# Patient Record
Sex: MALE | Race: WHITE | Employment: FULL TIME | URBAN - METROPOLITAN AREA
[De-identification: names, ages, dates, MRNs, and addresses within clinical notes are randomized per-mention and may not be internally consistent; named-entity substitution may affect disease eponyms.]

---

## 2017-01-17 DIAGNOSIS — K20.0 EOSINOPHILIC ESOPHAGITIS: Primary | ICD-10-CM

## 2017-01-25 ENCOUNTER — HOSPITAL ENCOUNTER (OUTPATIENT)
Facility: CLINIC | Age: 32
End: 2017-01-25
Attending: INTERNAL MEDICINE | Admitting: INTERNAL MEDICINE
Payer: COMMERCIAL

## 2017-02-07 DIAGNOSIS — K20.0 EOSINOPHILIC ESOPHAGITIS: Primary | ICD-10-CM

## 2017-03-01 DIAGNOSIS — K20.0 EOSINOPHILIC ESOPHAGITIS: ICD-10-CM

## 2017-03-02 RX ORDER — MECOBALAMIN 5000 MCG
15 TABLET,DISINTEGRATING ORAL 2 TIMES DAILY
Qty: 90 CAPSULE | Refills: 1 | Status: SHIPPED | OUTPATIENT
Start: 2017-03-02

## 2017-04-06 DIAGNOSIS — K20.0 EOSINOPHILIC ESOPHAGITIS: ICD-10-CM

## 2017-04-11 ENCOUNTER — OFFICE VISIT (OUTPATIENT)
Dept: FAMILY MEDICINE | Facility: CLINIC | Age: 32
End: 2017-04-11
Payer: COMMERCIAL

## 2017-04-11 VITALS
BODY MASS INDEX: 26.2 KG/M2 | SYSTOLIC BLOOD PRESSURE: 130 MMHG | HEART RATE: 101 BPM | HEIGHT: 70 IN | DIASTOLIC BLOOD PRESSURE: 82 MMHG | WEIGHT: 183 LBS | TEMPERATURE: 98 F | OXYGEN SATURATION: 99 %

## 2017-04-11 DIAGNOSIS — K20.0 EOSINOPHILIC ESOPHAGITIS: ICD-10-CM

## 2017-04-11 DIAGNOSIS — Z01.818 PREOP GENERAL PHYSICAL EXAM: Primary | ICD-10-CM

## 2017-04-11 PROCEDURE — 99214 OFFICE O/P EST MOD 30 MIN: CPT | Performed by: PREVENTIVE MEDICINE

## 2017-04-11 ASSESSMENT — PAIN SCALES - GENERAL: PAINLEVEL: NO PAIN (0)

## 2017-04-11 NOTE — MR AVS SNAPSHOT
After Visit Summary   4/11/2017    Jayce Ortiz    MRN: [id number]           Patient Information     Date Of Birth          1985        Visit Information        Provider Department      4/11/2017 7:00 AM Eugenia Chavez MD [facility]        Today's Diagnoses     Preop general physical exam    -  1    Eosinophilic esophagitis          Care Instructions    At [facility], we strive to deliver an exceptional experience to you, every time we see you.    If you receive a survey in the mail, please send us back your thoughts. We really do value your feedback.    Thank you for visiting [facility]    Normal or non-critical lab and imaging results will be communicated to you by MyChart, letter or phone within 7 days.  If you do not hear from us within 10 days, please call the clinic. If you have a critical or abnormal lab result, we will notify you by phone as soon as possible.     If you have any questions regarding your visit please contact:     Team Amanda/Spirit  Clinic Hours Telephone Number   Dr. Pascual Foster   7am-7pm  Monday through Thursday  7am-5pm Friday [phone]  Brianna TELLEZ RN   Pharmacy 8:30am-9pm Monday-Friday    9am-5pm Saturday-Sunday [phone]   Urgent Care 11am-9pm Monday-Friday        9am-5pm Saturday-Sunday [phone]     After hours, weekend or if you need to make an appointment with your primary provider please call [phone].   After Hours nurse advise: call [facility]: [phone]    Use Altacorhart (secure email communication and access to your chart) to send your primary care provider a message or make an appointment. Ask someone on your Team how to sign up for CITYBIZLIST. To log on to Monsoon Commerce or for more information in BalaBit please visit the website at [email or web address].   As  of October 8, 2013, all password changes, disabled accounts, or ID changes in Samanaget/MyHealth will be done by our Access Services Department.   If you need help with your account or password, call: [phone]. Clinic staff no longer has the ability to change passwords.     Before Your Surgery      Call your surgeon if there is any change in your health. This includes signs of a cold or flu (such as a sore throat, runny nose, cough, rash or fever).    Do not smoke, drink alcohol or take over the counter medicine (unless your surgeon or primary care doctor tells you to) for the 24 hours before and after surgery.    If you take prescribed drugs: Follow your doctor s orders about which medicines to take and which to stop until after surgery.    Eating and drinking prior to surgery: follow the instructions from your surgeon    Take a shower or bath the night before surgery. Use the soap your surgeon gave you to gently clean your skin. If you do not have soap from your surgeon, use your regular soap. Do not shave or scrub the surgery site.  Wear clean pajamas and have clean sheets on your bed.         Follow-ups after your visit        Your next 10 appointments already scheduled     Apr 24, 2017  6:00 PM CDT   New Visit with Armani Frost OD   [facility]    [address] [place] [id number] [phone]            Apr 27, 2017   Procedure with Julius Augustine MD   [facility] [place] ([facility], [facility])    [address] [address] [phone]           The [facility] is located on the corner of [address] and [address] on the [address] [facility]. It is easily accessible from virtually any point [place] the [place], via [address].            May 02, 2017  2:00 PM CDT   (Arrive by 1:45 PM)   New Patient Visit with James Coronado PA-C  "  [facility] ([facility])    [address]  [address]  [place] [address] [phone]              Who to contact     If you have questions or need follow up information about today's clinic visit or your schedule please contact [facility] directly at [phone].  Normal or non-critical lab and imaging results will be communicated to you by MyChart, letter or phone within 4 business days after the clinic has received the results. If you do not hear from us within 7 days, please contact the clinic through Cartup Commercehart or phone. If you have a critical or abnormal lab result, we will notify you by phone as soon as possible.  Submit refill requests through [email or web address] or call your pharmacy and they will forward the refill request to us. Please allow 3 business days for your refill to be completed.          Additional Information About Your Visit        [employer]up Commerce[employer] Information     [email or web address] gives you secure access to your electronic health record. If you see a primary care provider, you can also send messages to your care team and make appointments. If you have questions, please call your primary care clinic.  If you do not have a primary care provider, please call [phone] and they will assist you.        Care EveryWhere ID     This is your Care EveryWhere ID. This could be used by other organizations to access your [facility] medical records  [id number]        Your Vitals Were     Pulse Temperature Height Pulse Oximetry BMI (Body Mass Index)       101 98  F (36.7  C) (Oral) 5' 10\" (1.778 m) 99% 26.26 kg/m2        Blood Pressure from Last 3 Encounters:   04/11/17 130/82   12/12/16 (!) 133/97   10/17/16 (!) 146/98    Weight from Last 3 Encounters:   04/11/17 183 lb (83 kg)   12/12/16 180 lb (81.6 kg)   10/17/16 180 lb (81.6 kg)              Today, you had the following     No orders found for display       Primary Care Provider Office Phone # Fax # "    Eugenia Chavez [phone]       [facility] [address]  [facility] [address]        Thank you!     Thank you for choosing [facility]  for your care. Our goal is always to provide you with excellent care. Hearing back from our patients is one way we can continue to improve our services. Please take a few minutes to complete the written survey that you may receive in the mail after your visit with us. Thank you!             Your Updated Medication List - Protect others around you: Learn how to safely use, store and throw away your medicines at www.disposemymeds.org.          This list is accurate as of: 4/11/17  7:30 AM.  Always use your most recent med list.                   Brand Name Dispense Instructions for use    budesonide 0.6 mg/mL Susp    ENTOCORT    300 mL    Take 1.67 mLs (1 mg) by mouth 2 times daily       LANsoprazole 15 MG CR capsule    PREVACID    90 capsule    Take 1 capsule (15 mg) by mouth 2 times daily Take 30-60 minutes before a meal.       loratadine 10 MG tablet    CLARITIN     1 tablet daily as needed.       multivitamin, therapeutic Tabs tablet      Take 1 tablet by mouth daily

## 2017-04-11 NOTE — PATIENT INSTRUCTIONS
At [facility], we strive to deliver an exceptional experience to you, every time we see you.    If you receive a survey in the mail, please send us back your thoughts. We really do value your feedback.    Thank you for visiting [facility]    Normal or non-critical lab and imaging results will be communicated to you by MyChart, letter or phone within 7 days.  If you do not hear from us within 10 days, please call the clinic. If you have a critical or abnormal lab result, we will notify you by phone as soon as possible.     If you have any questions regarding your visit please contact:     Team Amanda/Spirit  Clinic Hours Telephone Number   Dr. Pascual Foster   7am-7pm  Monday through Thursday  7am-5pm Friday [phone]  Brianna TELLEZ RN   Pharmacy 8:30am-9pm Monday-Friday    9am-5pm Saturday-Sunday [phone]   Urgent Care 11am-9pm Monday-Friday        9am-5pm Saturday-Sunday [phone]     After hours, weekend or if you need to make an appointment with your primary provider please call [phone].   After Hours nurse advise: call [facility]: [phone]    Use UXCamhart (secure email communication and access to your chart) to send your primary care provider a message or make an appointment. Ask someone on your Team how to sign up for D[employer]. To log on to Akademos or for more information in Bagels and Bean please visit the website at [email or web address].   As of October 8, 2013, all password changes, disabled accounts, or ID changes in DKT Technology/MyHealth will be done by our Access Services Department.   If you need help with your account or password, call: [phone]. Clinic staff no longer has the ability to change passwords.     Before Your Surgery      Call your surgeon if there is any change in your health. This includes signs of a cold or flu (such  as a sore throat, runny nose, cough, rash or fever).    Do not smoke, drink alcohol or take over the counter medicine (unless your surgeon or primary care doctor tells you to) for the 24 hours before and after surgery.    If you take prescribed drugs: Follow your doctor s orders about which medicines to take and which to stop until after surgery.    Eating and drinking prior to surgery: follow the instructions from your surgeon    Take a shower or bath the night before surgery. Use the soap your surgeon gave you to gently clean your skin. If you do not have soap from your surgeon, use your regular soap. Do not shave or scrub the surgery site.  Wear clean pajamas and have clean sheets on your bed.

## 2017-04-11 NOTE — PROGRESS NOTES
[address] [address]  [phone]  Dept: [phone]    PRE-OP EVALUATION:  Today's date: 2017    Jayce Ortiz (: 1985) presents for pre-operative evaluation assessment as requested by Dr. Abraham.  He requires evaluation and anesthesia risk assessment prior to undergoing surgery/procedure for treatment of dysphagia and eosinophilic esophagitis.  Proposed procedure: EGD, Anesthesia: MAC    Date of Surgery/ Procedure: 17  Time of Surgery/ Procedure: 10:00 am  Hospital/Surgical Facility: [facility]  Fax number for surgical facility: [phone]  Primary Physician: Eugenia Chavez  Type of Anesthesia Anticipated: General    Patient has a Health Care Directive or Living Will:  NO    1. NO - Do you have a history of heart attack, stroke, stent, bypass or surgery on an artery in the head, neck, heart or legs?  2. NO - Do you ever have any pain or discomfort in your chest?  3. NO - Do you have a history of  Heart Failure?  4. NO - Are you troubled by shortness of breath when: walking on the level, up a slight hill or at night?  5. NO - Do you currently have a cold, bronchitis or other respiratory infection?  6. NO - Do you have a cough, shortness of breath or wheezing?  7. NO - Do you sometimes get pains in the calves of your legs when you walk?  8. YES - Do you or anyone in your family have previous history of blood clots? Mother with PE 2016.   9. NO - Do you or does anyone in your family have a serious bleeding problem such as prolonged bleeding following surgeries or cuts?  10. NO - Have you ever had problems with anemia or been told to take iron pills?  11. NO - Have you had any abnormal blood loss such as black, tarry or bloody stools, or abnormal vaginal bleeding?  12. NO - Have you ever had a blood transfusion?  13. NO - Have you or any of your relatives ever had problems with anesthesia?  14. YES - Do you have sleep apnea,  excessive snoring or daytime drowsiness? Snoring+, no past history of sleep apnea.    15. NO - Do you have any prosthetic heart valves?  16. NO - Do you have prosthetic joints?  17. NO - Is there any chance that you may be pregnant?      HPI:                                                      Brief HPI related to upcoming procedure: History of progressive dysphagia over the last 2 years. He was evaluated at the [facility] with an upper endoscopy by Dr. Rowell, which showed narrowing of the esophagus, mucosal changes and biopsies consistent with eosinophilic esophagitis.      See problem list for active medical problems.  Problems all longstanding and stable, except as noted/documented.  See ROS for pertinent symptoms related to these conditions.                                                                                                  .    MEDICAL HISTORY:                                                      Patient Active Problem List    Diagnosis Date Noted     Eosinophilic esophagitis 09/29/2016     Priority: Medium     Esophageal pain 09/29/2016     Priority: Medium     CARDIOVASCULAR SCREENING; LDL GOAL LESS THAN 160 06/08/2012     Family history of premature coronary artery disease 06/08/2012     Father with MI at the age of 35 years.        Other acne 08/05/2003      Past Medical History:   Diagnosis Date     Allergic rhinitis, cause unspecified      Elevated lipoprotein A level 6/14/12    On nonfasting specimen     Headache(784.0)      Other acne      Past Surgical History:   Procedure Laterality Date     COMBINED ESOPHAGOSCOPY, GASTROSCOPY, DUODENOSCOPY (EGD) WITH CO2 INSUFFLATION N/A 9/29/2016    Procedure: COMBINED ESOPHAGOSCOPY, GASTROSCOPY, DUODENOSCOPY (EGD) WITH CO2 INSUFFLATION;  Surgeon: Ruperto Rowell MD;  Location:  OR     ESOPHAGOSCOPY, GASTROSCOPY, DUODENOSCOPY (EGD), COMBINED N/A 9/29/2016    Procedure: COMBINED ESOPHAGOSCOPY, GASTROSCOPY, DUODENOSCOPY (EGD),  "BIOPSY SINGLE OR MULTIPLE;  Surgeon: Ruperto Rowell MD;  Location: [facility] OR     ESOPHAGOSCOPY, GASTROSCOPY, DUODENOSCOPY (EGD), COMBINED N/A 12/12/2016    Procedure: COMBINED ESOPHAGOSCOPY, GASTROSCOPY, DUODENOSCOPY (EGD);  Surgeon: Julius Monae MD;  Location: [facility]     NO HISTORY OF SURGERY       Current Outpatient Prescriptions   Medication Sig Dispense Refill     budesonide (ENTOCORT) 0.6 mg/mL SUSP Take 1.67 mLs (1 mg) by mouth 2 times daily 300 mL 0     LANsoprazole (PREVACID) 15 MG CR capsule Take 1 capsule (15 mg) by mouth 2 times daily Take 30-60 minutes before a meal. 90 capsule 1     multivitamin, therapeutic (THERA-VIT) TABS tablet Take 1 tablet by mouth daily       loratadine (CLARITIN) 10 MG tablet 1 tablet daily as needed.       OTC products: None, except as noted above    Allergies   Allergen Reactions     No Known Allergies       Latex Allergy: NO    Social History   Substance Use Topics     Smoking status: Never Smoker     Smokeless tobacco: Never Used      Comment: no smoke exposure at home     Alcohol use Yes      Comment: occasionally     History   Drug Use No       REVIEW OF SYSTEMS:                                                    Constitutional, neuro, ENT, endocrine, pulmonary, cardiac, gastrointestinal, genitourinary, musculoskeletal, integument and psychiatric systems are negative, except as otherwise noted.    EXAM:                                                    BP (!) 147/91  Pulse 101  Temp 98  F (36.7  C) (Oral)  Ht 5' 10\" (1.778 m)  Wt 183 lb (83 kg)  SpO2 99%  BMI 26.26 kg/m2    GENERAL APPEARANCE: healthy, alert and no distress     EYES: EOMI,  PERRL     NECK: no adenopathy, no asymmetry, masses, or scars and thyroid normal to palpation     RESP: lungs clear to auscultation - no rales, rhonchi or wheezes     CV: regular rates and rhythm, normal S1 S2, no S3 or S4 and no murmur, click or rub     ABDOMEN:  soft, nontender, no HSM or masses      MS: " extremities normal- no gross deformities noted, no evidence of inflammation in joints, FROM in all extremities.     SKIN: no suspicious lesions or rashes     NEURO: Normal strength and tone, sensory exam grossly normal, mentation intact and speech normal     PSYCH: mentation appears normal. and affect normal/bright     LYMPHATICS: No axillary, cervical, or supraclavicular nodes    DIAGNOSTICS:                                                      EKG: Not indicated due to non-vascular surgery and low risk of event (age <65 and without cardiac risk factors)  Labs Resulted Today:   Results for orders placed or performed during the hospital encounter of 12/12/16   UPPER GI ENDOSCOPY   Result Value Ref Range    Upper GI Endoscopy       [address] [place] [address] [phone]     Endoscopy Department  _______________________________________________________________________________  Patient Name: Jayce Ortiz       Procedure Date: 12/12/2016 10:08 AM  MRN: [id number]                       Account Number: [id number]  YOB: 1985             Admit Type: Outpatient  Age: 31                                Gender: Male  Note Status: Finalized                Attending MD: Julius Sanon MD  _______________________________________________________________________________     Procedure:           Upper GI endoscopy  Indications:         Dysphagia, Follow-up of eosinophilic esophagitis  Providers:           Julius Sanon MD, Can Hall, RN  Referring MD:        Chris Wesley  Medicines:           Midazolam 5 mg IV, Fentanyl 200 micrograms IV,                        Diphenhydramine 25 mg IV  Complications:       No immediate complications.  ________________ _______________________________________________________________  Procedure:           Pre-Anesthesia Assessment:                       - Prior to the procedure, a History and Physical was                         performed, and patient medications and allergies were                        reviewed. The patient is competent. The risks and                        benefits of the procedure and the sedation options and                        risks were discussed with the patient. All questions                        were answered and informed consent was obtained. Patient                        identification and proposed procedure were verified by                        the physician in the pre-procedure area. Mental Status                        Examination: alert and oriented. Airway Examination:                        normal oropharyngeal airway and neck mobility and                        Mallampati Class II (the uvula but not tonsillar pillars                        visualized). Respira tory Examination: clear to                        auscultation. CV Examination: normal. Prophylactic                        Antibiotics: The patient does not require prophylactic                        antibiotics. Prior Anticoagulants: The patient has taken                        no previous anticoagulant or antiplatelet agents. ASA                        Grade Assessment: II - A patient with mild systemic                        disease. After reviewing the risks and benefits, the                        patient was deemed in satisfactory condition to undergo                        the procedure. The anesthesia plan was to use moderate                        sedation / analgesia (conscious sedation). Immediately                        prior to administration of medications, the patient was                        re-assessed for adequacy to receive sedatives. The heart                        rate, respiratory rate, oxygen saturations, blood                        pressure, adequacy of p ulmonary ventilation, and                        response to care were monitored throughout the                        procedure. The physical status of  the patient was                        re-assessed after the procedure.                       After obtaining informed consent, the endoscope was                        passed under direct vision. Throughout the procedure,                        the patient's blood pressure, pulse, and oxygen                        saturations were monitored continuously. The Endoscope                        was introduced through the mouth, and advanced to the                        second part of duodenum. The upper GI endoscopy was                        accomplished without difficulty. The patient tolerated                        the procedure well.                                                                                   Findings:       Mucosal changes including ringed esophagus were found in the upper third        of the esophagus and in  the middle third of the esophagus, inner        diameter 10 mm. A guidewire was placed and the scope was withdrawn.        Dilation was performed with a Savary dilator with moderate resistance at        12 mm. Biopsies were taken with a cold forceps for histology in distal        and midesophagus.       The entire examined stomach was normal.       The duodenal bulb, first part of the duodenum and 2nd part of the        duodenum were normal.                                                                                   Impression:          - Esophageal mucosal changes consistent with                        eosinophilic esophagitis. Dilated with Savary dilator to                        12 mm. Biopsied.                       - Normal stomach.                       - Normal duodenal bulb, first part of the duodenum and                        2nd part of the duodenum.  Recommendation:      - Await pathology results.                       - The findings and recommendations were discussed  with                        the patient and their spouse.                       - Return to  "referring physician.                                                                                     _____________________  Julius Sanon MD  12/12/2016 11:02 AM  Number of Addenda: 0    Note Initiated On: 12/12/2016 10:08 AM     Surgical pathology exam   Result Value Ref Range    Copath Report       Patient Name: ERICK GIANG  MR#: [id number]  Specimen #: [id number]  Collected: 12/12/2016  Received: 12/12/2016  Reported: 12/13/2016 09:26  Ordering Phy(s): JULIUS MARMOLEJO    For improved result formatting, select 'View Enhanced Report Format'  under Linked Documents section.    SPECIMEN(S):  A: Esophageal biopsy, distal  B: Esophageal biopsy, mid    FINAL DIAGNOSIS:  A. DISTAL ESOPHAGUS, BIOPSY:  - Esophageal squamous mucosa with focal increased numbers of eosinophils  (up to 80/hpf) compatible with eosinophilic esophagitis  - No evidence of intestinal metaplasia or dysplasia  -Predominantly unremarkable squamous mucosa (eosinophilia is found on  only one of the multiple pieces)    B. MID ESOPHAGUS, BIOPSY:  - Esophageal squamous mucosa with increased numbers of eosinophils (up  to 40/hpf) compatible with eosinophilic esophagitis  - No evidence of intestinal metaplasia or dysplasia    I have personally reviewed all specimens and or slides, including the  listed special s tains, and used them with my medical judgement to  determine the final diagnosis.    Electronically signed out by:    Kevin Zamora M.D    CLINICAL HISTORY:    Dysphagia, eosinophilic esophagitis    GROSS:  A:  The specimen is received in formalin with proper patient  identification, labeled \"distal esophagus biopsies\".  The specimen  consists of four white tan soft tissue fragments ranging in size from  0.2-0.4 cm in greatest dimension, which are entirely submitted in  cassette A1.    B:  The specimen is received in formalin with proper patient  identification, labeled \"mid esophagus biopsy\".  The specimen consists  of a 0.7 " x 0.5 x 0.1 cm aggregate of white tan soft tissue fragments,  which are entirely submitted in cassettes B1. (Dictated by: Kailee Ruby 12/12/2016 03:14 PM)    MICROSCOPIC:    Microscopic exam performed    CPT Codes:  A: 49423-NB0  B: 93507-IN2    TESTING LAB LOCATION:  [facility] [address]  [address] [place] [address]   [id number] [phone]    COLLECTION SITE:  Client: [facility]  Location: [facility] (B)         Recent Labs   Lab Test  09/30/16   0705  09/29/16   1804  09/29/16   1700  07/13/16   1231   HGB  15.1   --   16.4   --    PLT  146*   --   171   --    INR   --   1.12  Unsatisfactory specimen - tube underfilled  NOTIFIED KIRSTIN AREVALO AT ,9/29/16 @ 1727 BY SY     --    NA  140   --   138   --    POTASSIUM  3.9   --   3.8   --    CR  0.79   --   0.75   --    A1C   --    --    --   5.1        IMPRESSION:                                                    Reason for surgery/procedure: Dysphagia and eosinophilic esophagitis.   Diagnosis/reason for consult: Pre operative clearance     The proposed surgical procedure is considered LOW risk.    REVISED CARDIAC RISK INDEX  The patient has the following serious cardiovascular risks for perioperative complications such as (MI, PE, VFib and 3  AV Block):  No serious cardiac risks  INTERPRETATION: 0 risks: Class I (very low risk - 0.4% complication rate)    The patient has the following additional risks for perioperative complications:  No identified additional risks  The ASCVD Risk score (Armin VANI Jr, et al., 2013) failed to calculate for the following reasons:    The 2013 ASCVD risk score is only valid for ages 40 to 79    Encounter Diagnoses   Name Primary?     Preop general physical exam Yes     Eosinophilic esophagitis          RECOMMENDATIONS:                                                        Cardiovascular Risk  Performs 4 METs exercise without symptoms  (Climb a flight of stairs) .       --Patient is to take all scheduled medications on the day of surgery EXCEPT for modifications listed below.    APPROVAL GIVEN to proceed with proposed procedure, without further diagnostic evaluation       Signed Electronically by: Eugenia Chavez MD MPH    Copy of this evaluation report is provided to requesting physician.    [facility] Preop Guidelines

## 2017-04-11 NOTE — PROGRESS NOTES
Faxed Pre-op notes, no labs, and no EKG to [facility],  [phone], right fax confirmed at 8:56 am today.  Dipika Pink MA/  For Teams Flor

## 2017-04-11 NOTE — NURSING NOTE
"Chief Complaint   Patient presents with     Pre-Op Exam       Initial BP (!) 147/91  Pulse 101  Temp 98  F (36.7  C) (Oral)  Ht 5' 10\" (1.778 m)  Wt 183 lb (83 kg)  SpO2 99%  BMI 26.26 kg/m2 Estimated body mass index is 26.26 kg/(m^2) as calculated from the following:    Height as of this encounter: 5' 10\" (1.778 m).    Weight as of this encounter: 183 lb (83 kg).  Medication Reconciliation: complete   Fransico LEDESMA        "

## 2017-04-12 ENCOUNTER — MYC MEDICAL ADVICE (OUTPATIENT)
Dept: GASTROENTEROLOGY | Facility: CLINIC | Age: 32
End: 2017-04-12

## 2017-04-27 ENCOUNTER — CARE COORDINATION (OUTPATIENT)
Dept: GASTROENTEROLOGY | Facility: CLINIC | Age: 32
End: 2017-04-27

## 2017-04-27 DIAGNOSIS — K20.0 EOSINOPHILIC ESOPHAGITIS: Primary | ICD-10-CM

## 2017-04-28 NOTE — PROGRESS NOTES
As requested d/t insurances changes referral placed to [facility] for patient to be seen by Dr. Ratliff.     Records and referral faxed and confirmed [facility] will contact patient with appointment info.     Jemima Zamarripa RN

## 2017-05-26 ENCOUNTER — CARE COORDINATION (OUTPATIENT)
Dept: GASTROENTEROLOGY | Facility: CLINIC | Age: 32
End: 2017-05-26

## 2017-05-26 NOTE — PROGRESS NOTES
[facility] RN Care Coordination Note:    Called and spoke with Charley at [facility] regarding referral for both upper endoscopy and clinic visit with Dr. Ratliff. She confirmed orders and stated they would reach out to patient to schedule.      Jemima Zamarripa RN   Care Coordinator - Dr Finnegan  [phone]

## 2018-04-13 ENCOUNTER — TRANSFERRED RECORDS (OUTPATIENT)
Dept: HEALTH INFORMATION MANAGEMENT | Facility: CLINIC | Age: 33
End: 2018-04-13

## 2018-10-29 ENCOUNTER — TRANSFERRED RECORDS (OUTPATIENT)
Dept: HEALTH INFORMATION MANAGEMENT | Facility: CLINIC | Age: 33
End: 2018-10-29

## 2018-11-12 ENCOUNTER — DOCUMENTATION ONLY (OUTPATIENT)
Dept: GASTROENTEROLOGY | Facility: CLINIC | Age: 33
End: 2018-11-12

## 2018-11-12 NOTE — PROGRESS NOTES
Received incoming fax from [facility] for office visit 10/29/18; has been scanned into chart.

## 2018-12-26 ENCOUNTER — TELEPHONE (OUTPATIENT)
Dept: ENDOCRINOLOGY | Facility: CLINIC | Age: 33
End: 2018-12-26

## 2019-01-18 ENCOUNTER — TRANSFERRED RECORDS (OUTPATIENT)
Dept: HEALTH INFORMATION MANAGEMENT | Facility: CLINIC | Age: 34
End: 2019-01-18

## 2019-02-18 ENCOUNTER — TRANSFERRED RECORDS (OUTPATIENT)
Dept: HEALTH INFORMATION MANAGEMENT | Facility: CLINIC | Age: 34
End: 2019-02-18

## 2019-03-04 ENCOUNTER — TRANSFERRED RECORDS (OUTPATIENT)
Dept: HEALTH INFORMATION MANAGEMENT | Facility: CLINIC | Age: 34
End: 2019-03-04

## 2019-04-12 ENCOUNTER — TRANSFERRED RECORDS (OUTPATIENT)
Dept: HEALTH INFORMATION MANAGEMENT | Facility: CLINIC | Age: 34
End: 2019-04-12

## 2019-11-03 ENCOUNTER — HEALTH MAINTENANCE LETTER (OUTPATIENT)
Age: 34
End: 2019-11-03

## 2020-06-04 ENCOUNTER — TRANSFERRED RECORDS (OUTPATIENT)
Dept: HEALTH INFORMATION MANAGEMENT | Facility: CLINIC | Age: 35
End: 2020-06-04

## 2020-11-16 ENCOUNTER — HEALTH MAINTENANCE LETTER (OUTPATIENT)
Age: 35
End: 2020-11-16

## 2021-09-18 ENCOUNTER — HEALTH MAINTENANCE LETTER (OUTPATIENT)
Age: 36
End: 2021-09-18

## 2021-11-22 ENCOUNTER — TRANSFERRED RECORDS (OUTPATIENT)
Dept: HEALTH INFORMATION MANAGEMENT | Facility: CLINIC | Age: 36
End: 2021-11-22
Payer: COMMERCIAL

## 2021-11-29 ENCOUNTER — TRANSFERRED RECORDS (OUTPATIENT)
Dept: HEALTH INFORMATION MANAGEMENT | Facility: CLINIC | Age: 36
End: 2021-11-29
Payer: COMMERCIAL

## 2022-01-08 ENCOUNTER — HEALTH MAINTENANCE LETTER (OUTPATIENT)
Age: 37
End: 2022-01-08

## 2022-05-10 ENCOUNTER — TELEPHONE (OUTPATIENT)
Dept: NEUROSURGERY | Facility: CLINIC | Age: 37
End: 2022-05-10
Payer: COMMERCIAL

## 2022-05-10 NOTE — TELEPHONE ENCOUNTER
Patient called back.  He appreciates everyones efforts to get him in but he found out his insurance is out of network.  Please cancel request to get in sooner.

## 2022-06-22 ENCOUNTER — TRANSFERRED RECORDS (OUTPATIENT)
Dept: HEALTH INFORMATION MANAGEMENT | Facility: CLINIC | Age: 37
End: 2022-06-22

## 2022-11-20 ENCOUNTER — HEALTH MAINTENANCE LETTER (OUTPATIENT)
Age: 37
End: 2022-11-20

## 2023-04-15 ENCOUNTER — HEALTH MAINTENANCE LETTER (OUTPATIENT)
Age: 38
End: 2023-04-15

## 2024-06-16 ENCOUNTER — HEALTH MAINTENANCE LETTER (OUTPATIENT)
Age: 39
End: 2024-06-16